# Patient Record
(demographics unavailable — no encounter records)

---

## 2017-01-01 NOTE — DELATT
===========================

Datetime: 2017 15:54

===========================

   

Del Note Time:  20

Del Note Status:  Term Male AGA

Del Note Attendant Role 1:  MD Bush Note Attendant 1:  Sangeetaserjio Bush Note Reason for Attend Other:  Repeat scheduled 

Del Note Interventions:  Assessment; Stimulation; Drying

Del Note Reason for Attending:   Section

KENNY/NICU Del Atten Note Adm DT:  2017 15:56

## 2017-01-01 NOTE — NBPN
===========================

Datetime: 2017 13:50

===========================

   

Nsy Prov Gen Appearance:  Within Normal Limits

Nsy Prov Skin:  Within Normal Limits

Nsy Prov Neuro:  Normal Tone; Vern; Grasp; Root; Suck

Nsy Prov Musculoskeletal:  Within Normal Limits; Full Range of Motion; Spontaneous Movement All Extre
mities; Intact Clavicles; Clavicles without Crepitus; Gluteal Folds Symmetrical; Spine Within Normal 
Limits; No Sacral Dimple/Cyst

Nsy Prov Head:  Normal Fontanelles; Normocephalic; Sutures WNL

Nsy Prov EENT:  Mouth Within Normal Limits; Ears Within Normal Limits; Eyes Within Normal Limits; Eye
s Red Reflex Bilaterally; Nose Within Normal Limits; Face Within Normal Limits

Nsy Prov Cardiovascular:  Within Normal Limits; Normal Pulses

Nsy Prov Respiratory:  Within Normal Limits

Nsy Prov GI:  Within Normal Limits; Soft; Normal Liver; Non Palpable Spleen; Patent Anus

Nsy Prov Umbilicus:  Within Normal Limits; Three Vessel Cord

Nsy Prov :  Normal Male Genitalia

Nsy Prov PE Comments:  Pt. examined w/ mother and GM @ bedside. Mother requesting circ.

Nsy Prov Impression:  Healthy Term [place]; Vital Signs Appropriate; Bonding Appropriately; Voiding a
nd Stooling

Nsy Prov Plan:  Continue  Care; Circumcision Consult; Lactation Consult

Nsy Prov Impression/Plan Details:  Dx: Well 39 wks AGA Male/Repeat scheduled C/S/

   PLANS: Routine NN Care. Pt. is cleared for circ.

Nsy Prov Laboratory:  None

## 2017-01-01 NOTE — NBDCN
===========================

Datetime: 2017 10:23

===========================

   

Nsy Prov Gen Appearance:  Within Normal Limits

Nsy Prov Skin:  Within Normal Limits

Nsy Prov Neuro:  Normal Tone; [place]; Grasp; Root; Suck

Nsy Prov Musculoskeletal:  Within Normal Limits; Full Range of Motion; Spontaneous Movement All Extre
mities; Intact Clavicles; Clavicles without Crepitus; Gluteal Folds Symmetrical; Spine Within Normal 
Limits; No Sacral Dimple/Cyst

Nsy Prov Head:  Normal Fontanelles; Normocephalic; Sutures WNL

Nsy Prov EENT:  Mouth Within Normal Limits; Ears Within Normal Limits; Eyes Within Normal Limits; Eye
s Red Reflex Bilaterally; Nose Within Normal Limits; Face Within Normal Limits

Nsy Prov Cardiovascular:  Within Normal Limits; Normal Pulses

Nsy Prov Respiratory:  Within Normal Limits

Nsy Prov GI:  Within Normal Limits; Soft; Normal Liver; Non Palpable Spleen; Patent Anus

Nsy Prov Umbilicus:  Within Normal Limits; Three Vessel Cord

Nsy Prov :  Normal Male Genitalia

Nsy Prov Discharge:  Discharge Home Today; Healthy Term ; Vital Signs Appropriate; Bonding Nikolai
ropriately; Voiding and Stooling

Nsy Prov Disch Comments:  Term male [place]

   Repeat Elective C- Section

   Mother O Positive, baby O Positive negative SAMI. TCB at 64.37 was 9.8

   Baby lost 9% of the weight

   follow up with [facility] in 2-day for  examination weight check

   Plans discussed with mother

Follow up in Weeks NB:  2-day

Disch Follow Up With:  [facility]

Follow up Appt with NB:  Office

   

===========================

Datetime: 2017 07:40

===========================

   

Lab, Bilirubin Transcutaneous:  9.8

Peak Bilirubin Transcutaneous:  10.1

Hearing Screen Status:  Hearing Screen Complete

Congenital Heart Screen:  Negative, Congenital Heart Screen Complete

   

===========================

Datetime: 2017 03:00

===========================

   

Formula Type:  Similac Advance

   

===========================

Datetime: 2017 23:00

===========================

   

Lab, Bilirubin Transcutaneous DT:  2017 23:00

   

===========================

Datetime: 2017 21:38

===========================

   

Hearing Screen Retest Result, NB:  Right Ear Pass; Left Ear Pass

Blood Type:  O Positive

Lab, Direct Matt:  Negative

   

===========================

Datetime: 2017 01:00

===========================

   

Hepatitis B Vaccine NB:  2017 00:00 (Annotations: 01:44 Hep B vaccine im given RAT Lot # [id number]
[id number] exp. 18.)

[place] Screenin2017 02:00 (Annotations:  # [id number].)

   

===========================

Datetime: 2017 18:24

===========================

   

Infant Birthdate and Time:  2017 15:18

Infant Sex - 1:  Male

Gestational Age at Deliv:  39.0

Method of Delivery:  

Vacuum Extraction:  N/A

Forceps:  N/A

Mother's Steroids Given:  None

Apgar Score 1, NB:  9

Apgar Score5, NB:  9

Maternal Amniotic Fluid Color:  Clear

Mother's Blood Type:  O Positive

Mother's Hepatitis B:  Negative

Mother's Gonorrhea:  Negative

Mother's Chlamydia:  Negative

Mother's RPR/VDRL:  Nonreactive

Mother's HIV+ Exposure Test MBL:  Negative

Mother's Hx Herpes:  No

Mother's Rubella:  Immune

Mother's Group Beta Strep:  Negative

Mother's Antibiotics # of Doses:  1

Admission Birthweight, NB:  2985

Infant Weight (lb) MBL:  6

Infant Weight (oz) MBL:  9

Maternal Feeding Preference:  Breast

   

===========================

Datetime: 2017 18:05

===========================

   

Hearing Screen Result, NB:  Right Ear Refer; Left Ear Refer

   

===========================

Datetime: 2017 16:00

===========================

   

Head Circumference (cm), NB:  33.00

Chest Circumference, NB:  32.00

   

===========================

Datetime: 2017 15:54

===========================

   

Discharge Weight gms NB:  2706

Discharge Weight lbs NB:  5

Discharge Weight oz NB:  15

Circumcision Equipment:  Gomco Clamp

Circumcision Date/Time:  2017 17:42

## 2017-01-01 NOTE — NBADN
===========================

Datetime: 2017 15:57

===========================

   

Nsy Prov Gen Appearance:  Within Normal Limits

Nsy Prov Gen Appearance:  Within Normal Limits

Nsy Prov Skin:  Within Normal Limits

Nsy Prov Neuro:  Normal Tone; [place]; Grasp; Root; Suck

Nsy Prov Musculoskeletal:  Within Normal Limits; Full Range of Motion; Spontaneous Movement All Extre
mities; Intact Clavicles; Clavicles without Crepitus; Gluteal Folds Symmetrical; Spine Within Normal 
Limits; No Sacral Dimple/Cyst

Nsy Prov Head:  Normal Fontanelles; Normocephalic; Sutures WNL

Nsy Prov EENT:  Mouth Within Normal Limits; Ears Within Normal Limits; Eyes Within Normal Limits; Eye
s Red Reflex Bilaterally; Nose Within Normal Limits; Face Within Normal Limits

Nsy Prov Cardiovascular:  Within Normal Limits; Normal Pulses

Nsy Prov Respiratory:  Within Normal Limits

Nsy Prov GI:  Within Normal Limits; Soft; Normal Liver; Non Palpable Spleen; Patent Anus

Nsy Prov Umbilicus:  Within Normal Limits; Three Vessel Cord

Nsy Prov :  Normal Male Genitalia

Nsy Prov Impression:  Healthy Term ; Vital Signs Appropriate; Bonding Appropriately

Nsy Prov Plan:  Continue  Care

Nsy Prov Impression/Plan Details:  Term male AGA

   Repeat , scheduled

   

===========================

Datetime: 2017 15:54

===========================

   

Mother's Rule Inc Maternal Age:  Age >=35 at MEL not specified

Mother's Rule Thalassemia:  Thalassemia History not specified

Mother's Rule Neural Tube Defect:  Neural Tube Defect History not specified

Mother's Rule Congenital Heart:  Congenital Heart Defect not specified

Mother's Rule Down Syndrome:  Down Syndrome History not specified

Mother's Rule Hernan-Sachs:  Hernan-Sachs History not specified

Mother's Rule [place]:  [place] History not specified

Mother's Rule Familial Dysauto:  Familial Dysautonomia History not specified

Mother's Rule Sickle Cell:  Sickle Cell Disease/Trait History not specified

Mother's Rule Hemophilia:  Hemophilia/Blood Disorder History not specified

Mother's Rule Muscular Dystrophy:  Muscular Dystrophy History not specified

Mother's Rule Cystic Fibrosis:  Cystic Fibrosis History not specified

Mother's Rule Screven's Chor:  Allan's Chorea History not specified

Mother's Rule Mental Retardation:  Mental Retardation/Autism History not specified

Mother's Rule Fragile X:  Fragile X Testing History not specified

Mother's Rule Oth Inherited DO:  Other Inherited/Chromosomal Disorders not specified

Mother's Rule Maternal Metabolic:  Maternal Metabolic History not specified

Mother's Rule FOB Birth Defects:  Pt Father or FOB Birth Defect History not specified

Mother's Rule Hx Stillborn MBL:  Loss/Stillborn History not specified

Mother's Rule Other Genetic Hx:  Other Genetic History not specified

Mother's Rule Drugs/Medications:  Drugs/Medications History not specified

Mother's Rule Gonorrhea:   Gonorrhea History Not Specified

Mother's Rule Chlamydia:  Chlamydia History not specified

Mother's Rule Syphilis:  Syphilis History not specified

Mother's Rule HIV/AIDS Exp:  HIV/Aids Exposure not specified

Mother's Rule HPV:  Human Papillomavirus History not specified

Mother's Rule Genital Herpes:  Genital Herpes not specified

Mother's Rule TB:  Tuberculosis History not specified

Mother's Rule Hepatitis:  Hepatitis History Not Specified

Mother's Rule Rash or Viral Ill:  Rash or Viral Illness History not specified

Mother's Rule Diabetes:  Diabetes History not specified

Mother's Rule Hypertension MBL:  History of Hypertension Not Specified

Mother's Rule Heart Disease:  Heart Disease History not specified

Mother's Rule Autoimmune:  Autoimmune Disorder History not specified

Mother's Rule Kidney Disease:  History of Kidney Disease/UTI not specified

Mother's Rule Neurologic:  Neurologic/Epilepsy Disorders not specified

Mother's Rule Psych Disorders:  Psychiatric Disorder History not specified

Mother's Rule Depression/PP Dep:  Depression/Postpartum Depression History not specified

Mother's Rule Hepaitis/tLiver:  History of Hepatitis/Liver Disease not specified

Mother's Rule Varicos/Phlebitis:  Varicosities/Phlebitis History Not Specified

Mother's Rule Thyroid Dysfunct:  Thyroid Dysfunction not specified

Mother's Rule Trauma/Violence:  Trauma/Violence History Not Specified

Mother's Rule Blood Transfusion:  Blood Transfusion History not specified

Mother's Rule Sensitization:  D (Rh) Sensitization not specified

Mother's Rule Pulmonary:  Pulmonary (Asthma, TB) History not specified

Mother's Rule Breast:  Breast History not specified

Mother's Rule Gyn Surgery:  Gyn Surgery Hx not specified

Mother's Rule Hosp/Surgery:  Hospitalization/Surgery History not specified

Mother's Rule Anesthetic Comp:  Anesthetic Complications Hx not specified

Mother's Rule Abnormal Pap:  Abnormal Pap Smear not specified

Mother's Rule Uterine Anomaly:  Uterine Anomaly/BILL not specified

Mother's Rule Infertility:  Infertility Not Specified

Mother's Rule ART Treatment:  ART Treatment History not specified

Mother's Rule Other Med Disease:  Other Medical Diseases History not specified

Mother's Rule Family History:  Significant Family History not specified

## 2017-01-01 NOTE — NBCIR
===========================

Datetime: 2017 18:24

===========================

   

Circumcision Request:  Yes

   

===========================

Datetime: 2017 15:54

===========================

   

Preformed by::  Dr. Leia Augustin

Consent Signed:  Verbal Consent Obtained; Written Consent Signed and on Chart

Position:  Supine; Papoose Board

Circumcision Time Out:  Correct Patient Identity; Accurate Procedure Consent Form; Agreement on Proce
dure to be Done; Correct Patient Position

Site Prep:  Povidine Iodine

Circumcision Date/Time:  2017 17:42

Block/Anesthestics:  Emla Cream

Equipment Used:  Gomco Clamp

Bell Size:  1.3

Systemic Medications:  Oral Medication

Complications:  None

Status:  Excellent Cosmetic Outcome; Tolerated Procedure Well; Hemostatic

Parents Present:  None

Procedure Note:  After having obtained informed consent, under sterile conditions circumcision perfor
med without incident. Hemostasis assured. Patient tolerated procedure well; taken back to mother in s
table condition. 

   

===========================

Datetime: 2017 15:47

===========================

   

PT-NAME:  ROSELYN ABDI OF [place]

## 2017-01-01 NOTE — NBPN
===========================

Datetime: 2017 17:51

===========================

   

Nsy Prov Gen Appearance:  Within Normal Limits

Nsy Prov Skin:  Within Normal Limits

Nsy Prov Neuro:  Normal Tone; [place]; Grasp; Root; Suck

Nsy Prov Musculoskeletal:  Within Normal Limits; Full Range of Motion; Spontaneous Movement All Extre
mities; Intact Clavicles; Clavicles without Crepitus; Gluteal Folds Symmetrical; Spine Within Normal 
Limits; No Sacral Dimple/Cyst

Nsy Prov Head:  Normal Fontanelles; Normocephalic; Sutures WNL

Nsy Prov EENT:  Mouth Within Normal Limits; Ears Within Normal Limits; Eyes Within Normal Limits; Eye
s Red Reflex Bilaterally; Nose Within Normal Limits; Face Within Normal Limits

Nsy Prov Cardiovascular:  Within Normal Limits; Normal Pulses

Nsy Prov Respiratory:  Within Normal Limits

Nsy Prov GI:  Within Normal Limits; Soft; Normal Liver; Non Palpable Spleen; Patent Anus

Nsy Prov Umbilicus:  Within Normal Limits; Three Vessel Cord

Nsy Prov :  Normal Male Genitalia

Nsy Prov Impression:  Healthy Term [place]; Vital Signs Appropriate; Bonding Appropriately; Voiding a
nd Stooling

Nsy Prov Plan:  Continue  Care

Nsy Prov Impression/Plan Details:  Term male 

   Repeat elective  (Annotations: Data stored by CPN on behalf of user)

   

===========================

Datetime: 2017 13:50

===========================

   

Nsy Prov PE Comments:  Pt. examined w/ mother and GM @ bedside.  Mother requesting circ.